# Patient Record
Sex: MALE | URBAN - METROPOLITAN AREA
[De-identification: names, ages, dates, MRNs, and addresses within clinical notes are randomized per-mention and may not be internally consistent; named-entity substitution may affect disease eponyms.]

---

## 2023-11-24 ENCOUNTER — LAB REQUISITION (OUTPATIENT)
Dept: LAB | Facility: CLINIC | Age: 58
End: 2023-11-24

## 2023-11-24 ENCOUNTER — TRANSFERRED RECORDS (OUTPATIENT)
Dept: HEALTH INFORMATION MANAGEMENT | Facility: CLINIC | Age: 58
End: 2023-11-24
Payer: COMMERCIAL

## 2023-11-24 DIAGNOSIS — Z12.5 ENCOUNTER FOR SCREENING FOR MALIGNANT NEOPLASM OF PROSTATE: ICD-10-CM

## 2023-11-24 DIAGNOSIS — E11.65 TYPE 2 DIABETES MELLITUS WITH HYPERGLYCEMIA (H): ICD-10-CM

## 2023-11-24 DIAGNOSIS — E78.5 HYPERLIPIDEMIA, UNSPECIFIED: ICD-10-CM

## 2023-11-24 LAB
ALBUMIN SERPL BCG-MCNC: 4.2 G/DL (ref 3.5–5.2)
ALP SERPL-CCNC: 106 U/L (ref 40–150)
ALT SERPL W P-5'-P-CCNC: 14 U/L (ref 0–70)
ANION GAP SERPL CALCULATED.3IONS-SCNC: 14 MMOL/L (ref 7–15)
AST SERPL W P-5'-P-CCNC: 12 U/L (ref 0–45)
BILIRUB SERPL-MCNC: 0.3 MG/DL
BUN SERPL-MCNC: 15 MG/DL (ref 6–20)
CALCIUM SERPL-MCNC: 9.1 MG/DL (ref 8.6–10)
CHLORIDE SERPL-SCNC: 103 MMOL/L (ref 98–107)
CHOLEST SERPL-MCNC: 194 MG/DL
CREAT SERPL-MCNC: 0.79 MG/DL (ref 0.67–1.17)
CREAT UR-MCNC: 124 MG/DL
DEPRECATED HCO3 PLAS-SCNC: 16 MMOL/L (ref 22–29)
EGFRCR SERPLBLD CKD-EPI 2021: >90 ML/MIN/1.73M2
GLUCOSE SERPL-MCNC: 327 MG/DL (ref 70–99)
HBA1C MFR BLD: 11.8 % (ref 4.2–6.1)
HDLC SERPL-MCNC: 26 MG/DL
LDLC SERPL CALC-MCNC: 103 MG/DL
MICROALBUMIN UR-MCNC: 17.1 MG/L
MICROALBUMIN/CREAT UR: 13.79 MG/G CR (ref 0–17)
NONHDLC SERPL-MCNC: 168 MG/DL
POTASSIUM SERPL-SCNC: 4.1 MMOL/L (ref 3.4–5.3)
PROT SERPL-MCNC: 7.3 G/DL (ref 6.4–8.3)
PSA SERPL DL<=0.01 NG/ML-MCNC: 0.29 NG/ML (ref 0–3.5)
SODIUM SERPL-SCNC: 133 MMOL/L (ref 135–145)
TRIGL SERPL-MCNC: 324 MG/DL

## 2023-11-24 PROCEDURE — G0103 PSA SCREENING: HCPCS | Performed by: PHYSICIAN ASSISTANT

## 2023-11-24 PROCEDURE — 82570 ASSAY OF URINE CREATININE: CPT | Performed by: PHYSICIAN ASSISTANT

## 2023-11-24 PROCEDURE — 80061 LIPID PANEL: CPT | Performed by: PHYSICIAN ASSISTANT

## 2023-11-24 PROCEDURE — 80053 COMPREHEN METABOLIC PANEL: CPT | Performed by: PHYSICIAN ASSISTANT

## 2023-11-29 ENCOUNTER — OFFICE VISIT (OUTPATIENT)
Dept: PHARMACY | Facility: PHYSICIAN GROUP | Age: 58
End: 2023-11-29
Payer: COMMERCIAL

## 2023-11-29 DIAGNOSIS — E78.5 HYPERLIPIDEMIA, UNSPECIFIED HYPERLIPIDEMIA TYPE: ICD-10-CM

## 2023-11-29 DIAGNOSIS — E11.9 TYPE 2 DIABETES MELLITUS WITHOUT COMPLICATION, WITHOUT LONG-TERM CURRENT USE OF INSULIN (H): Primary | ICD-10-CM

## 2023-11-29 PROCEDURE — 99207 PR NO CHARGE LOS: CPT | Performed by: PHARMACIST
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
## 2023-12-01 VITALS — DIASTOLIC BLOOD PRESSURE: 74 MMHG | HEART RATE: 82 BPM | SYSTOLIC BLOOD PRESSURE: 128 MMHG

## 2023-12-01 RX ORDER — LISINOPRIL 2.5 MG/1
2.5 TABLET ORAL DAILY
COMMUNITY

## 2023-12-01 RX ORDER — SEMAGLUTIDE 0.68 MG/ML
0.25 INJECTION, SOLUTION SUBCUTANEOUS
COMMUNITY

## 2023-12-01 RX ORDER — ASPIRIN 81 MG/1
81 TABLET ORAL DAILY
COMMUNITY

## 2023-12-01 RX ORDER — ATORVASTATIN CALCIUM 40 MG/1
40 TABLET, FILM COATED ORAL DAILY
COMMUNITY

## 2024-01-10 ENCOUNTER — LAB REQUISITION (OUTPATIENT)
Dept: LAB | Facility: CLINIC | Age: 59
End: 2024-01-10

## 2024-01-10 DIAGNOSIS — E11.65 TYPE 2 DIABETES MELLITUS WITH HYPERGLYCEMIA (H): ICD-10-CM

## 2024-01-10 PROCEDURE — 80053 COMPREHEN METABOLIC PANEL: CPT | Performed by: PHYSICIAN ASSISTANT

## 2024-01-11 LAB
ALBUMIN SERPL BCG-MCNC: 3 G/DL (ref 3.5–5.2)
ALP SERPL-CCNC: 62 U/L (ref 40–150)
ALT SERPL W P-5'-P-CCNC: 13 U/L (ref 0–70)
ANION GAP SERPL CALCULATED.3IONS-SCNC: 15 MMOL/L (ref 7–15)
AST SERPL W P-5'-P-CCNC: 13 U/L (ref 0–45)
BILIRUB SERPL-MCNC: 0.5 MG/DL
BUN SERPL-MCNC: 8.4 MG/DL (ref 6–20)
CALCIUM SERPL-MCNC: 8.4 MG/DL (ref 8.6–10)
CHLORIDE SERPL-SCNC: 108 MMOL/L (ref 98–107)
CREAT SERPL-MCNC: 0.69 MG/DL (ref 0.67–1.17)
DEPRECATED HCO3 PLAS-SCNC: 17 MMOL/L (ref 22–29)
EGFRCR SERPLBLD CKD-EPI 2021: >90 ML/MIN/1.73M2
GLUCOSE SERPL-MCNC: 140 MG/DL (ref 70–99)
POTASSIUM SERPL-SCNC: 3.8 MMOL/L (ref 3.4–5.3)
PROT SERPL-MCNC: 6.2 G/DL (ref 6.4–8.3)
SODIUM SERPL-SCNC: 140 MMOL/L (ref 135–145)

## 2024-03-12 ENCOUNTER — TRANSFERRED RECORDS (OUTPATIENT)
Dept: HEALTH INFORMATION MANAGEMENT | Facility: CLINIC | Age: 59
End: 2024-03-12
Payer: COMMERCIAL

## 2024-03-12 LAB — HBA1C MFR BLD: 7 % (ref 4.2–6.1)
